# Patient Record
Sex: FEMALE | Race: WHITE | NOT HISPANIC OR LATINO | Employment: FULL TIME | URBAN - METROPOLITAN AREA
[De-identification: names, ages, dates, MRNs, and addresses within clinical notes are randomized per-mention and may not be internally consistent; named-entity substitution may affect disease eponyms.]

---
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
## 2024-06-20 ENCOUNTER — LAB (OUTPATIENT)
Dept: LAB | Facility: LAB | Age: 19
End: 2024-06-20
Payer: COMMERCIAL

## 2024-06-20 ENCOUNTER — APPOINTMENT (OUTPATIENT)
Dept: PRIMARY CARE | Facility: CLINIC | Age: 19
End: 2024-06-20
Payer: COMMERCIAL

## 2024-06-20 VITALS
SYSTOLIC BLOOD PRESSURE: 127 MMHG | HEIGHT: 66 IN | HEART RATE: 72 BPM | OXYGEN SATURATION: 96 % | DIASTOLIC BLOOD PRESSURE: 80 MMHG | WEIGHT: 131 LBS | BODY MASS INDEX: 21.05 KG/M2

## 2024-06-20 DIAGNOSIS — Z02.0 SCHOOL HEALTH EXAMINATION: Primary | ICD-10-CM

## 2024-06-20 DIAGNOSIS — Z76.0 MEDICATION REFILL: ICD-10-CM

## 2024-06-20 DIAGNOSIS — Z02.0 SCHOOL HEALTH EXAMINATION: ICD-10-CM

## 2024-06-20 DIAGNOSIS — Z30.09 ENCOUNTER FOR FEMALE FAMILY PLANNING COUNSELING: ICD-10-CM

## 2024-06-20 DIAGNOSIS — Z30.011 ENCOUNTER FOR BCP (BIRTH CONTROL PILLS) INITIAL PRESCRIPTION: ICD-10-CM

## 2024-06-20 PROCEDURE — 1036F TOBACCO NON-USER: CPT

## 2024-06-20 PROCEDURE — 86481 TB AG RESPONSE T-CELL SUSP: CPT

## 2024-06-20 PROCEDURE — 36415 COLL VENOUS BLD VENIPUNCTURE: CPT

## 2024-06-20 PROCEDURE — 99213 OFFICE O/P EST LOW 20 MIN: CPT

## 2024-06-20 ASSESSMENT — ENCOUNTER SYMPTOMS
FATIGUE: 0
ARTHRALGIAS: 0
JOINT SWELLING: 0
SINUS PAIN: 0
COUGH: 0
FACIAL ASYMMETRY: 0
WOUND: 0
SHORTNESS OF BREATH: 0
TROUBLE SWALLOWING: 0
UNEXPECTED WEIGHT CHANGE: 0
PALPITATIONS: 0
LIGHT-HEADEDNESS: 0
WHEEZING: 0
SINUS PRESSURE: 0
SEIZURES: 0
WEAKNESS: 0
PSYCHIATRIC NEGATIVE: 1
RHINORRHEA: 0

## 2024-06-20 ASSESSMENT — PROMIS GLOBAL HEALTH SCALE
RATE_AVERAGE_PAIN: 0
RATE_MENTAL_HEALTH: GOOD
RATE_SOCIAL_SATISFACTION: EXCELLENT
RATE_PHYSICAL_HEALTH: VERY GOOD
RATE_GENERAL_HEALTH: VERY GOOD
RATE_QUALITY_OF_LIFE: VERY GOOD
EMOTIONAL_PROBLEMS: SOMETIMES
CARRYOUT_PHYSICAL_ACTIVITIES: COMPLETELY
RATE_AVERAGE_FATIGUE: MILD
CARRYOUT_SOCIAL_ACTIVITIES: EXCELLENT

## 2024-06-20 NOTE — PROGRESS NOTES
"Subjective   Patient ID: Navarro Juarez is a 18 y.o. 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 She is not pregnant. Treatments tried: obc. The treatment provided significant relief.        Review of Systems   Constitutional:  Negative for fatigue and unexpected weight change.   HENT:  Negative for congestion, ear discharge, ear pain, nosebleeds, postnasal drip, rhinorrhea, sinus pressure, sinus pain, sneezing and trouble swallowing.    Respiratory:  Negative for cough, shortness of breath and wheezing.    Cardiovascular:  Negative for chest pain, palpitations and leg swelling.   Genitourinary:  Negative for menstrual problem, missed menses, pelvic pain, vaginal bleeding and vaginal pain.   Musculoskeletal:  Negative for arthralgias, gait problem and joint swelling.   Skin:  Negative for wound.   Neurological:  Negative for seizures, facial asymmetry, weakness and light-headedness.   Psychiatric/Behavioral: Negative.         Objective   /80   Pulse 72   Ht 1.676 m (5' 6\")   Wt 59.4 kg (131 lb)   SpO2 96%   BMI 21.14 kg/m²     Physical Exam  Vitals and nursing note reviewed.   Constitutional:       General: She is not in acute distress.     Appearance: Normal appearance. She is not ill-appearing.   Cardiovascular:      Heart sounds: Normal heart sounds.   Pulmonary:      Breath sounds: Normal breath sounds.   Abdominal:      Palpations: Abdomen is soft.   Neurological:      Mental Status: She is alert.         Assessment/Plan   Navarro was seen today for follow-up.  Diagnoses and all orders for this visit:  School health " examination  -     [employer] TB; Future  Encounter for female family planning counseling  -     norethindrone-ethin estradioL (Necon) 0.5-35 mg-mcg tablet; Take 1 tablet by mouth once daily.  Encounter for BCP (birth control pills) initial prescription  -     norethindrone-ethin estradioL (Necon) 0.5-35 mg-mcg tablet; Take 1 tablet by mouth once daily.

## 2024-06-24 LAB
NIL(NEG) CONTROL SPOT COUNT: NORMAL
PANEL A SPOT COUNT: 0
PANEL B SPOT COUNT: 0
POS CONTROL SPOT COUNT: NORMAL
T-SPOT. TB INTERPRETATION: NEGATIVE

## 2024-09-20 ENCOUNTER — APPOINTMENT (OUTPATIENT)
Dept: PRIMARY CARE | Facility: CLINIC | Age: 19
End: 2024-09-20
Payer: COMMERCIAL

## 2024-09-20 VITALS
BODY MASS INDEX: 21.53 KG/M2 | OXYGEN SATURATION: 98 % | SYSTOLIC BLOOD PRESSURE: 111 MMHG | DIASTOLIC BLOOD PRESSURE: 73 MMHG | HEART RATE: 75 BPM | HEIGHT: 66 IN | WEIGHT: 134 LBS

## 2024-09-20 DIAGNOSIS — Z30.09 ENCOUNTER FOR FEMALE FAMILY PLANNING COUNSELING: ICD-10-CM

## 2024-09-20 DIAGNOSIS — Z00.00 WELLNESS EXAMINATION: Primary | ICD-10-CM

## 2024-09-20 DIAGNOSIS — Z30.011 ENCOUNTER FOR BCP (BIRTH CONTROL PILLS) INITIAL PRESCRIPTION: ICD-10-CM

## 2024-09-20 PROCEDURE — 3008F BODY MASS INDEX DOCD: CPT

## 2024-09-20 PROCEDURE — 1036F TOBACCO NON-USER: CPT

## 2024-09-20 PROCEDURE — 99395 PREV VISIT EST AGE 18-39: CPT

## 2024-09-20 ASSESSMENT — PROMIS GLOBAL HEALTH SCALE
EMOTIONAL_PROBLEMS: SOMETIMES
RATE_MENTAL_HEALTH: GOOD
RATE_PHYSICAL_HEALTH: VERY GOOD
RATE_SOCIAL_SATISFACTION: VERY GOOD
CARRYOUT_PHYSICAL_ACTIVITIES: MOSTLY
RATE_QUALITY_OF_LIFE: VERY GOOD
CARRYOUT_SOCIAL_ACTIVITIES: EXCELLENT
RATE_AVERAGE_PAIN: 2
RATE_AVERAGE_FATIGUE: MILD
RATE_GENERAL_HEALTH: VERY GOOD

## 2024-09-20 ASSESSMENT — ENCOUNTER SYMPTOMS
JOINT SWELLING: 0
DYSURIA: 0
SEIZURES: 0
ABDOMINAL PAIN: 0
WOUND: 0
RHINORRHEA: 0
PALPITATIONS: 0
NAUSEA: 0
SINUS PAIN: 0
PSYCHIATRIC NEGATIVE: 1
HEMATURIA: 0
FACIAL ASYMMETRY: 0
ARTHRALGIAS: 0
COUGH: 0
LIGHT-HEADEDNESS: 0
CONSTIPATION: 0
BACK PAIN: 0
WEAKNESS: 0
FEVER: 0
FATIGUE: 0
WHEEZING: 0
TROUBLE SWALLOWING: 0
SORE THROAT: 0
HEADACHES: 1
UNEXPECTED WEIGHT CHANGE: 0
SINUS PRESSURE: 0
SHORTNESS OF BREATH: 0

## 2024-09-20 NOTE — PROGRESS NOTES
Subjective   Patient ID: Navarro Juarez is a 19 y.o. female who presents for Annual Exam (CPE/Concerns with joint pain started OTC fish oil which has helped slighly).    Pt is here for annual wellness.  Reports overall health is good    Do you take any herbs or supplements that were not prescribed by a doctor? Yes fish oil for pain  Are you taking calcium supplements? no  Are you taking aspirin daily? no  Colonoscopy: n/a  Mammo:aunt at age 34  Fasting blood work: uptodate  Last eye exam: 2024  Last dental Exam: sep 2024  Exercise:gym twice per week run every other day  Mood:pleasant  Sleep: good  Diet:  well rounded  Occupation:  [employer]  Do you have pain that bothers you in your daily life? yes    1. Patient Counseling:  --Nutrition: Stressed importance of moderation in sodium/caffeine intake, saturated fat and cholesterol, caloric balance, sufficient intake of fresh fruits, vegetables, fiber, calcium, iron, and 1 mg of folate supplement per day (for females capable of pregnancy).  --Discussed the issue of estrogen replacement, calcium supplement, and the daily use of baby aspirin as appropriate per pt.  --Exercise: Stressed the importance of regular exercise.   --Substance Abuse: Discussed cessation/primary prevention of tobacco, alcohol, or other drug use; driving or other dangerous activities under the influence; availability of treatment for abuse.    --Sexuality: Discussed sexually transmitted diseases, partner selection, use of condoms, avoidance of unintended pregnancy  and contraceptive alternatives.   --Injury prevention: Discussed safety belts, safety helmets, smoke detector, smoking near bedding or upholstery.   --Dental health: Discussed importance of regular tooth brushing, flossing, and dental visits.  --Immunizations reviewed.  --Discussed benefits of colon cancer screening.  --After hours service discussed with patient  2 Discussed the patient's BMI.  The BMI is in the acceptable  "range.  3 Follow up as needed for acute illness           Review of Systems   Constitutional:  Negative for fatigue, fever and unexpected weight change.   HENT:  Negative for congestion, ear discharge, ear pain, nosebleeds, postnasal drip, rhinorrhea, sinus pressure, sinus pain, sneezing, sore throat and trouble swallowing.    Respiratory:  Negative for cough, shortness of breath and wheezing.    Cardiovascular:  Negative for chest pain, palpitations and leg swelling.   Gastrointestinal:  Negative for abdominal pain, constipation and nausea.   Genitourinary:  Negative for dysuria, hematuria, menstrual problem, pelvic pain, vaginal bleeding and vaginal pain.   Musculoskeletal:  Negative for arthralgias, back pain, gait problem and joint swelling.   Skin:  Negative for rash and wound.   Neurological:  Positive for headaches. Negative for seizures, facial asymmetry, weakness and light-headedness.   Psychiatric/Behavioral: Negative.         Objective   /73   Pulse 75   Ht 1.676 m (5' 6\")   Wt 60.8 kg (134 lb)   SpO2 98%   BMI 21.63 kg/m²     Physical Exam  Constitutional:       Appearance: Normal appearance.   HENT:      Head: Normocephalic and atraumatic.      Right Ear: Tympanic membrane and external ear normal.      Left Ear: Tympanic membrane and external ear normal.      Nose: Nose normal.      Mouth/Throat:      Mouth: Mucous membranes are moist.      Pharynx: Oropharynx is clear. Uvula midline.   Eyes:      General: Lids are normal.      Extraocular Movements: Extraocular movements intact.      Pupils: Pupils are equal, round, and reactive to light.   Neck:      Thyroid: No thyromegaly or thyroid tenderness.   Cardiovascular:      Rate and Rhythm: Normal rate and regular rhythm.      Heart sounds: Normal heart sounds.   Pulmonary:      Effort: Pulmonary effort is normal.      Breath sounds: Normal breath sounds.   Abdominal:      General: Bowel sounds are normal.      Palpations: Abdomen is soft.      " Tenderness: There is no abdominal tenderness. There is no guarding.   Musculoskeletal:         General: No swelling. Normal range of motion.      Cervical back: Normal range of motion.      Right lower leg: No edema.      Left lower leg: No edema.   Lymphadenopathy:      Head:      Right side of head: No submental, submandibular or tonsillar adenopathy.      Left side of head: No submental, submandibular or tonsillar adenopathy.      Cervical: No cervical adenopathy.   Skin:     General: Skin is warm and dry.      Capillary Refill: Capillary refill takes less than 2 seconds.      Coloration: Skin is not jaundiced.      Findings: No lesion or rash.   Neurological:      General: No focal deficit present.      Mental Status: She is alert and oriented to person, place, and time.   Psychiatric:         Mood and Affect: Mood normal.         Behavior: Behavior normal.         Thought Content: Thought content normal.         Judgment: Judgment normal.          Assessment/Plan   Navarro was seen today for annual exam.  Diagnoses and all orders for this visit:  Encounter for female family planning counseling  -     norethindrone-ethin estradioL (Necon) 0.5-35 mg-mcg tablet; Take 1 tablet by mouth once daily.  Encounter for BCP (birth control pills) initial prescription  -     norethindrone-ethin estradioL (Necon) 0.5-35 mg-mcg tablet; Take 1 tablet by mouth once daily.

## 2025-09-24 ENCOUNTER — APPOINTMENT (OUTPATIENT)
Dept: PRIMARY CARE | Facility: CLINIC | Age: 20
End: 2025-09-24
Payer: COMMERCIAL

## 2025-09-26 ENCOUNTER — APPOINTMENT (OUTPATIENT)
Dept: PRIMARY CARE | Facility: CLINIC | Age: 20
End: 2025-09-26
Payer: COMMERCIAL